# Patient Record
Sex: MALE | Race: WHITE | URBAN - METROPOLITAN AREA
[De-identification: names, ages, dates, MRNs, and addresses within clinical notes are randomized per-mention and may not be internally consistent; named-entity substitution may affect disease eponyms.]

---

## 2019-02-11 ENCOUNTER — HOSPITAL ENCOUNTER (OUTPATIENT)
Age: 24
Setting detail: SPECIMEN
Discharge: HOME | End: 2019-02-11
Payer: COMMERCIAL

## 2019-02-12 LAB — KEPPRA: 48 UG/ML

## 2021-08-08 ENCOUNTER — HOSPITAL ENCOUNTER (INPATIENT)
Age: 26
LOS: 1 days | Discharge: HOME | DRG: 101 | End: 2021-08-09
Attending: EMERGENCY MEDICINE | Admitting: INTERNAL MEDICINE

## 2021-08-08 ENCOUNTER — APPOINTMENT (OUTPATIENT)
Dept: GENERAL RADIOLOGY | Age: 26
DRG: 101 | End: 2021-08-08

## 2021-08-08 ENCOUNTER — APPOINTMENT (OUTPATIENT)
Dept: CT IMAGING | Age: 26
DRG: 101 | End: 2021-08-08

## 2021-08-08 DIAGNOSIS — R11.2 NAUSEA AND VOMITING, INTRACTABILITY OF VOMITING NOT SPECIFIED, UNSPECIFIED VOMITING TYPE: ICD-10-CM

## 2021-08-08 DIAGNOSIS — G40.919 BREAKTHROUGH SEIZURE (HCC): Primary | ICD-10-CM

## 2021-08-08 PROBLEM — R56.9 SEIZURE (HCC): Status: ACTIVE | Noted: 2021-08-08

## 2021-08-08 LAB
ALBUMIN SERPL-MCNC: 5.3 GM/DL (ref 3.4–5)
ALCOHOL SCREEN SERUM: 0.01 %WT/VOL
ALP BLD-CCNC: 146 IU/L (ref 40–129)
ALT SERPL-CCNC: 12 U/L (ref 10–40)
ANION GAP SERPL CALCULATED.3IONS-SCNC: 20 MMOL/L (ref 4–16)
AST SERPL-CCNC: 18 IU/L (ref 15–37)
BACTERIA: NEGATIVE /HPF
BASE EXCESS: 4 (ref 0–3.3)
BASOPHILS ABSOLUTE: 0.1 K/CU MM
BASOPHILS RELATIVE PERCENT: 0.4 % (ref 0–1)
BILIRUB SERPL-MCNC: 0.2 MG/DL (ref 0–1)
BILIRUBIN URINE: NEGATIVE MG/DL
BLOOD, URINE: ABNORMAL
BUN BLDV-MCNC: 9 MG/DL (ref 6–23)
CALCIUM SERPL-MCNC: 9.6 MG/DL (ref 8.3–10.6)
CHLORIDE BLD-SCNC: 104 MMOL/L (ref 99–110)
CLARITY: CLEAR
CO2: 14 MMOL/L (ref 21–32)
COLOR: YELLOW
COMMENT: ABNORMAL
CREAT SERPL-MCNC: 1 MG/DL (ref 0.9–1.3)
DIFFERENTIAL TYPE: ABNORMAL
EOSINOPHILS ABSOLUTE: 0.1 K/CU MM
EOSINOPHILS RELATIVE PERCENT: 0.2 % (ref 0–3)
GFR AFRICAN AMERICAN: >60 ML/MIN/1.73M2
GFR NON-AFRICAN AMERICAN: >60 ML/MIN/1.73M2
GLUCOSE BLD-MCNC: 109 MG/DL (ref 70–99)
GLUCOSE BLD-MCNC: 112 MG/DL (ref 70–99)
GLUCOSE, URINE: NEGATIVE MG/DL
HCO3 VENOUS: 23.8 MMOL/L (ref 19–25)
HCT VFR BLD CALC: 49.2 % (ref 42–52)
HEMOGLOBIN: 15.4 GM/DL (ref 13.5–18)
IMMATURE NEUTROPHIL %: 1.4 % (ref 0–0.43)
KETONES, URINE: ABNORMAL MG/DL
LACTATE: 1.6 MMOL/L (ref 0.4–2)
LACTATE: 3.4 MMOL/L (ref 0.4–2)
LEUKOCYTE ESTERASE, URINE: NEGATIVE
LIPASE: 26 IU/L (ref 13–60)
LYMPHOCYTES ABSOLUTE: 2.1 K/CU MM
LYMPHOCYTES RELATIVE PERCENT: 9 % (ref 24–44)
MAGNESIUM: 2.6 MG/DL (ref 1.8–2.4)
MCH RBC QN AUTO: 28.2 PG (ref 27–31)
MCHC RBC AUTO-ENTMCNC: 31.3 % (ref 32–36)
MCV RBC AUTO: 90.1 FL (ref 78–100)
MONOCYTES ABSOLUTE: 1.2 K/CU MM
MONOCYTES RELATIVE PERCENT: 5 % (ref 0–4)
NITRITE URINE, QUANTITATIVE: NEGATIVE
NUCLEATED RBC %: 0 %
O2 SAT, VEN: 74.7 % (ref 50–70)
PCO2, VEN: 53 MMHG (ref 38–52)
PDW BLD-RTO: 13 % (ref 11.7–14.9)
PH VENOUS: 7.26 (ref 7.32–7.42)
PH, URINE: 5 (ref 5–8)
PLATELET # BLD: 422 K/CU MM (ref 140–440)
PMV BLD AUTO: 9 FL (ref 7.5–11.1)
PO2, VEN: 46 MMHG (ref 28–48)
POTASSIUM SERPL-SCNC: 4.6 MMOL/L (ref 3.5–5.1)
PROTEIN UA: 30 MG/DL
RBC # BLD: 5.46 M/CU MM (ref 4.6–6.2)
RBC URINE: <1 /HPF (ref 0–3)
SEGMENTED NEUTROPHILS ABSOLUTE COUNT: 19.2 K/CU MM
SEGMENTED NEUTROPHILS RELATIVE PERCENT: 84 % (ref 36–66)
SODIUM BLD-SCNC: 138 MMOL/L (ref 135–145)
SPECIFIC GRAVITY UA: 1.02 (ref 1–1.03)
TOTAL IMMATURE NEUTOROPHIL: 0.33 K/CU MM
TOTAL NUCLEATED RBC: 0 K/CU MM
TOTAL PROTEIN: 8.8 GM/DL (ref 6.4–8.2)
TRICHOMONAS: ABNORMAL /HPF
UNCLASSIFIED CAST: 3 /LPF
UROBILINOGEN, URINE: NEGATIVE MG/DL (ref 0.2–1)
WBC # BLD: 22.9 K/CU MM (ref 4–10.5)
WBC UA: 1 /HPF (ref 0–2)

## 2021-08-08 PROCEDURE — 96374 THER/PROPH/DIAG INJ IV PUSH: CPT

## 2021-08-08 PROCEDURE — 83690 ASSAY OF LIPASE: CPT

## 2021-08-08 PROCEDURE — 82805 BLOOD GASES W/O2 SATURATION: CPT

## 2021-08-08 PROCEDURE — G0480 DRUG TEST DEF 1-7 CLASSES: HCPCS

## 2021-08-08 PROCEDURE — 80177 DRUG SCRN QUAN LEVETIRACETAM: CPT

## 2021-08-08 PROCEDURE — 36415 COLL VENOUS BLD VENIPUNCTURE: CPT

## 2021-08-08 PROCEDURE — 2580000003 HC RX 258: Performed by: NURSE PRACTITIONER

## 2021-08-08 PROCEDURE — 82962 GLUCOSE BLOOD TEST: CPT

## 2021-08-08 PROCEDURE — 6360000002 HC RX W HCPCS: Performed by: NURSE PRACTITIONER

## 2021-08-08 PROCEDURE — 2140000000 HC CCU INTERMEDIATE R&B

## 2021-08-08 PROCEDURE — 87150 DNA/RNA AMPLIFIED PROBE: CPT

## 2021-08-08 PROCEDURE — 2580000003 HC RX 258: Performed by: PHYSICIAN ASSISTANT

## 2021-08-08 PROCEDURE — 87040 BLOOD CULTURE FOR BACTERIA: CPT

## 2021-08-08 PROCEDURE — 6360000002 HC RX W HCPCS: Performed by: EMERGENCY MEDICINE

## 2021-08-08 PROCEDURE — 83605 ASSAY OF LACTIC ACID: CPT

## 2021-08-08 PROCEDURE — 85025 COMPLETE CBC W/AUTO DIFF WBC: CPT

## 2021-08-08 PROCEDURE — 80307 DRUG TEST PRSMV CHEM ANLYZR: CPT

## 2021-08-08 PROCEDURE — 70450 CT HEAD/BRAIN W/O DYE: CPT

## 2021-08-08 PROCEDURE — 83735 ASSAY OF MAGNESIUM: CPT

## 2021-08-08 PROCEDURE — 99284 EMERGENCY DEPT VISIT MOD MDM: CPT

## 2021-08-08 PROCEDURE — 6360000002 HC RX W HCPCS

## 2021-08-08 PROCEDURE — 6370000000 HC RX 637 (ALT 250 FOR IP): Performed by: EMERGENCY MEDICINE

## 2021-08-08 PROCEDURE — 80053 COMPREHEN METABOLIC PANEL: CPT

## 2021-08-08 PROCEDURE — 6360000002 HC RX W HCPCS: Performed by: PHYSICIAN ASSISTANT

## 2021-08-08 PROCEDURE — 2580000003 HC RX 258: Performed by: EMERGENCY MEDICINE

## 2021-08-08 PROCEDURE — 81001 URINALYSIS AUTO W/SCOPE: CPT

## 2021-08-08 PROCEDURE — 96361 HYDRATE IV INFUSION ADD-ON: CPT

## 2021-08-08 RX ORDER — SODIUM CHLORIDE 0.9 % (FLUSH) 0.9 %
5-40 SYRINGE (ML) INJECTION EVERY 12 HOURS SCHEDULED
Status: DISCONTINUED | OUTPATIENT
Start: 2021-08-08 | End: 2021-08-08 | Stop reason: SDUPTHER

## 2021-08-08 RX ORDER — ONDANSETRON 4 MG/1
4 TABLET, ORALLY DISINTEGRATING ORAL EVERY 8 HOURS PRN
Status: DISCONTINUED | OUTPATIENT
Start: 2021-08-08 | End: 2021-08-09 | Stop reason: HOSPADM

## 2021-08-08 RX ORDER — SODIUM CHLORIDE 9 MG/ML
25 INJECTION, SOLUTION INTRAVENOUS PRN
Status: DISCONTINUED | OUTPATIENT
Start: 2021-08-08 | End: 2021-08-09 | Stop reason: HOSPADM

## 2021-08-08 RX ORDER — LORAZEPAM 2 MG/ML
INJECTION INTRAMUSCULAR
Status: COMPLETED
Start: 2021-08-08 | End: 2021-08-08

## 2021-08-08 RX ORDER — SODIUM CHLORIDE 0.9 % (FLUSH) 0.9 %
5-40 SYRINGE (ML) INJECTION PRN
Status: DISCONTINUED | OUTPATIENT
Start: 2021-08-08 | End: 2021-08-09 | Stop reason: HOSPADM

## 2021-08-08 RX ORDER — PROMETHAZINE HYDROCHLORIDE 25 MG/1
25 TABLET ORAL ONCE
Status: COMPLETED | OUTPATIENT
Start: 2021-08-08 | End: 2021-08-08

## 2021-08-08 RX ORDER — ACETAMINOPHEN 650 MG/1
650 SUPPOSITORY RECTAL EVERY 6 HOURS PRN
Status: DISCONTINUED | OUTPATIENT
Start: 2021-08-08 | End: 2021-08-09 | Stop reason: HOSPADM

## 2021-08-08 RX ORDER — LORAZEPAM 1 MG/1
4 TABLET ORAL
Status: DISCONTINUED | OUTPATIENT
Start: 2021-08-08 | End: 2021-08-09 | Stop reason: HOSPADM

## 2021-08-08 RX ORDER — LORAZEPAM 2 MG/ML
2 INJECTION INTRAMUSCULAR
Status: DISCONTINUED | OUTPATIENT
Start: 2021-08-08 | End: 2021-08-09 | Stop reason: HOSPADM

## 2021-08-08 RX ORDER — LORAZEPAM 1 MG/1
3 TABLET ORAL
Status: DISCONTINUED | OUTPATIENT
Start: 2021-08-08 | End: 2021-08-09 | Stop reason: HOSPADM

## 2021-08-08 RX ORDER — LANOLIN ALCOHOL/MO/W.PET/CERES
100 CREAM (GRAM) TOPICAL DAILY
Status: DISCONTINUED | OUTPATIENT
Start: 2021-08-08 | End: 2021-08-09 | Stop reason: HOSPADM

## 2021-08-08 RX ORDER — SODIUM CHLORIDE 0.9 % (FLUSH) 0.9 %
5-40 SYRINGE (ML) INJECTION PRN
Status: DISCONTINUED | OUTPATIENT
Start: 2021-08-08 | End: 2021-08-08 | Stop reason: SDUPTHER

## 2021-08-08 RX ORDER — LEVETIRACETAM 500 MG/1
1500 TABLET ORAL 2 TIMES DAILY
Status: DISCONTINUED | OUTPATIENT
Start: 2021-08-08 | End: 2021-08-08

## 2021-08-08 RX ORDER — LORAZEPAM 1 MG/1
1 TABLET ORAL
Status: DISCONTINUED | OUTPATIENT
Start: 2021-08-08 | End: 2021-08-09 | Stop reason: HOSPADM

## 2021-08-08 RX ORDER — LORAZEPAM 1 MG/1
2 TABLET ORAL
Status: DISCONTINUED | OUTPATIENT
Start: 2021-08-08 | End: 2021-08-09 | Stop reason: HOSPADM

## 2021-08-08 RX ORDER — ONDANSETRON 2 MG/ML
4 INJECTION INTRAMUSCULAR; INTRAVENOUS EVERY 6 HOURS PRN
Status: DISCONTINUED | OUTPATIENT
Start: 2021-08-08 | End: 2021-08-09 | Stop reason: HOSPADM

## 2021-08-08 RX ORDER — ACETAMINOPHEN 325 MG/1
650 TABLET ORAL EVERY 6 HOURS PRN
Status: DISCONTINUED | OUTPATIENT
Start: 2021-08-08 | End: 2021-08-09 | Stop reason: HOSPADM

## 2021-08-08 RX ORDER — LORAZEPAM 2 MG/ML
2 INJECTION INTRAMUSCULAR EVERY 6 HOURS PRN
Status: DISCONTINUED | OUTPATIENT
Start: 2021-08-08 | End: 2021-08-09 | Stop reason: HOSPADM

## 2021-08-08 RX ORDER — 0.9 % SODIUM CHLORIDE 0.9 %
1000 INTRAVENOUS SOLUTION INTRAVENOUS ONCE
Status: COMPLETED | OUTPATIENT
Start: 2021-08-08 | End: 2021-08-08

## 2021-08-08 RX ORDER — SODIUM CHLORIDE 9 MG/ML
25 INJECTION, SOLUTION INTRAVENOUS PRN
Status: DISCONTINUED | OUTPATIENT
Start: 2021-08-08 | End: 2021-08-08 | Stop reason: SDUPTHER

## 2021-08-08 RX ORDER — LORAZEPAM 2 MG/ML
3 INJECTION INTRAMUSCULAR
Status: DISCONTINUED | OUTPATIENT
Start: 2021-08-08 | End: 2021-08-09 | Stop reason: HOSPADM

## 2021-08-08 RX ORDER — ONDANSETRON 4 MG/1
4 TABLET, ORALLY DISINTEGRATING ORAL 3 TIMES DAILY PRN
Qty: 21 TABLET | Refills: 0 | Status: SHIPPED | OUTPATIENT
Start: 2021-08-08

## 2021-08-08 RX ORDER — LORAZEPAM 2 MG/ML
2 INJECTION INTRAMUSCULAR ONCE
Status: COMPLETED | OUTPATIENT
Start: 2021-08-08 | End: 2021-08-08

## 2021-08-08 RX ORDER — LEVETIRACETAM 750 MG/1
1500 TABLET ORAL 2 TIMES DAILY
COMMUNITY

## 2021-08-08 RX ORDER — SODIUM CHLORIDE, SODIUM LACTATE, POTASSIUM CHLORIDE, CALCIUM CHLORIDE 600; 310; 30; 20 MG/100ML; MG/100ML; MG/100ML; MG/100ML
INJECTION, SOLUTION INTRAVENOUS CONTINUOUS
Status: DISPENSED | OUTPATIENT
Start: 2021-08-08 | End: 2021-08-09

## 2021-08-08 RX ORDER — M-VIT,TX,IRON,MINS/CALC/FOLIC 27MG-0.4MG
1 TABLET ORAL DAILY
Status: DISCONTINUED | OUTPATIENT
Start: 2021-08-08 | End: 2021-08-09 | Stop reason: HOSPADM

## 2021-08-08 RX ORDER — LEVETIRACETAM 500 MG/1
1500 TABLET ORAL 2 TIMES DAILY
Status: DISCONTINUED | OUTPATIENT
Start: 2021-08-09 | End: 2021-08-09 | Stop reason: HOSPADM

## 2021-08-08 RX ORDER — LORAZEPAM 2 MG/ML
4 INJECTION INTRAMUSCULAR
Status: DISCONTINUED | OUTPATIENT
Start: 2021-08-08 | End: 2021-08-09 | Stop reason: HOSPADM

## 2021-08-08 RX ORDER — LORAZEPAM 2 MG/ML
1 INJECTION INTRAMUSCULAR
Status: DISCONTINUED | OUTPATIENT
Start: 2021-08-08 | End: 2021-08-09 | Stop reason: HOSPADM

## 2021-08-08 RX ORDER — SODIUM CHLORIDE 0.9 % (FLUSH) 0.9 %
5-40 SYRINGE (ML) INJECTION EVERY 12 HOURS SCHEDULED
Status: DISCONTINUED | OUTPATIENT
Start: 2021-08-08 | End: 2021-08-09 | Stop reason: HOSPADM

## 2021-08-08 RX ORDER — ONDANSETRON 4 MG/1
4 TABLET, ORALLY DISINTEGRATING ORAL ONCE
Status: COMPLETED | OUTPATIENT
Start: 2021-08-08 | End: 2021-08-08

## 2021-08-08 RX ORDER — POLYETHYLENE GLYCOL 3350 17 G/17G
17 POWDER, FOR SOLUTION ORAL DAILY PRN
Status: DISCONTINUED | OUTPATIENT
Start: 2021-08-08 | End: 2021-08-09 | Stop reason: HOSPADM

## 2021-08-08 RX ADMIN — ONDANSETRON 4 MG: 4 TABLET, ORALLY DISINTEGRATING ORAL at 15:06

## 2021-08-08 RX ADMIN — PROMETHAZINE HYDROCHLORIDE 25 MG: 25 TABLET ORAL at 15:46

## 2021-08-08 RX ADMIN — LEVETIRACETAM 1500 MG: 100 INJECTION, SOLUTION INTRAVENOUS at 22:34

## 2021-08-08 RX ADMIN — ENOXAPARIN SODIUM 40 MG: 40 INJECTION SUBCUTANEOUS at 22:34

## 2021-08-08 RX ADMIN — SODIUM CHLORIDE 1000 ML: 9 INJECTION, SOLUTION INTRAVENOUS at 17:59

## 2021-08-08 RX ADMIN — LORAZEPAM 2 MG: 2 INJECTION INTRAMUSCULAR; INTRAVENOUS at 16:13

## 2021-08-08 RX ADMIN — SODIUM CHLORIDE, POTASSIUM CHLORIDE, SODIUM LACTATE AND CALCIUM CHLORIDE: 600; 310; 30; 20 INJECTION, SOLUTION INTRAVENOUS at 22:35

## 2021-08-08 RX ADMIN — SODIUM CHLORIDE, PRESERVATIVE FREE 10 ML: 5 INJECTION INTRAVENOUS at 22:36

## 2021-08-08 RX ADMIN — LEVETIRACETAM 1500 MG: 100 INJECTION, SOLUTION INTRAVENOUS at 17:06

## 2021-08-08 RX ADMIN — LORAZEPAM 2 MG: 2 INJECTION INTRAMUSCULAR at 16:13

## 2021-08-08 RX ADMIN — SODIUM CHLORIDE 1000 ML: 9 INJECTION, SOLUTION INTRAVENOUS at 15:45

## 2021-08-08 ASSESSMENT — ENCOUNTER SYMPTOMS
BACK PAIN: 0
SHORTNESS OF BREATH: 0
EYE DISCHARGE: 0
RHINORRHEA: 0
ABDOMINAL PAIN: 0
EYE PAIN: 0
NAUSEA: 1
COUGH: 0
SORE THROAT: 0
VOMITING: 1

## 2021-08-08 NOTE — ED PROVIDER NOTES
[address] ENCOUNTER      Pt Name: Stephanie Moore  MRN: [id number]  [place] 1995  Date of evaluation: 8/8/2021  Provider: Abner Maldonado MD    CHIEF COMPLAINT       Chief Complaint   Patient presents with    Seizures     Hx of seizures, per girlfriend he drank a 12 pack last night         [address] Mychal Childress is a 27 y.o. male who presents to the emergency department  for   Chief Complaint   Patient presents with    Seizures     Hx of seizures, per girlfriend he drank a 12 pack last night       53-year-old male presents with reported breakthrough seizure. He is also having nausea and vomiting. He reportedly drank a 12 pack of beer yesterday evening. He is on Keppra. He presents to the emergency department for evaluation. No report that he injured himself during the seizures. He is amnestic about the seizures in the emergency department. He does endorse significant alcohol use yesterday evening. Denies any marked abdominal pain. No vomiting was initially appreciated on her presentation is external signs of trauma. He is answering questions and moving extremity spontaneously. GCS of 15 in the emergency department. No convulsive activity noted. He denies any loss of bowel or urinary. No tongue biting. Nursing Notes, Triage Notes & Vital Signs were reviewed. REVIEW OF SYSTEMS    (2-9 systems for level 4, 10 or more for level 5)     Review of Systems   Constitutional: Negative for chills and fever. HENT: Negative for congestion, rhinorrhea and sore throat. Eyes: Negative for pain and discharge. Respiratory: Negative for cough and shortness of breath. Gastrointestinal: Positive for nausea and vomiting. Negative for abdominal pain. Endocrine: Negative for polydipsia and polyuria. Genitourinary: Negative for dysuria and flank pain.    Musculoskeletal: Negative for back pain and neck pain. Skin: Negative for pallor and wound. Neurological: Positive for seizures. Psychiatric/Behavioral: Negative for confusion. Except as noted above the remainder of the review of systems was reviewed and negative. PAST MEDICAL HISTORY   No past medical history on file. Prior to Admission medications    Medication Sig Start Date End Date Taking? Authorizing Provider   ondansetron (ZOFRAN-ODT) 4 MG disintegrating tablet Take 1 tablet by mouth 3 times daily as needed for Nausea or Vomiting 8/8/21  Yes Ron Renteria MD        There is no problem list on file for this patient. SURGICAL HISTORY     No past surgical history on file. CURRENT MEDICATIONS       Previous Medications    No medications on file       ALLERGIES     Antihistamines, diphenhydramine-type    FAMILY HISTORY     No family history on file. SOCIAL HISTORY       Social History     Socioeconomic History    Marital status: Single     Spouse name: Not on file    Number of children: Not on file    Years of education: Not on file    Highest education level: Not on file   Occupational History    Not on file   Tobacco Use    Smoking status: Not on file   Substance and Sexual Activity    Alcohol use: Not on file    Drug use: Not on file    Sexual activity: Not on file   Other Topics Concern    Not on file   Social History Narrative    Not on file     Social Determinants of Health     Financial Resource Strain:     Difficulty of Paying Living Expenses:    Food Insecurity:     Worried About Running Out of Food in the Last Year:     [address] in the Last Year:    Transportation Needs:     Lack of Transportation (Medical):      Lack of Transportation (Non-Medical):    Physical Activity:     Days of Exercise per Week:     Minutes of Exercise per Session:    Stress:     Feeling of Stress :    Social Connections:     Frequency of Communication with Friends and Family:     Frequency of Social Gatherings with Friends and Family:     Attends Sabianism Services:     Active Member of Clubs or Organizations:     Attends Club or Organization Meetings:     Marital Status:    Intimate Partner Violence:     Fear of Current or Ex-Partner:     Emotionally Abused:     Physically Abused:     Sexually Abused:        SCREENINGS               PHYSICAL EXAM    (up to 7 for level 4, 8 or more for level 5)     ED Triage Vitals   BP Temp Temp src Pulse Resp SpO2 Height Weight   -- -- -- -- -- -- -- --       Physical Exam  Vitals reviewed. Constitutional:       General: He is not in acute distress. Appearance: He is not diaphoretic. HENT:      Head: Normocephalic and atraumatic. Nose: No congestion or rhinorrhea. Mouth/Throat:      Mouth: Mucous membranes are moist.      Pharynx: No oropharyngeal exudate or posterior oropharyngeal erythema. Eyes:      General:         Right eye: No discharge. Left eye: No discharge. Pupils: Pupils are equal, round, and reactive to light. Cardiovascular:      Rate and Rhythm: Tachycardia present. Heart sounds: No friction rub. No gallop. Pulmonary:      Effort: Pulmonary effort is normal. No respiratory distress. Chest:      Chest wall: No tenderness. Abdominal:      Tenderness: There is no abdominal tenderness. There is no guarding. Musculoskeletal:         General: No tenderness or deformity. Normal range of motion. Cervical back: Normal range of motion and neck supple. Skin:     General: Skin is warm. Capillary Refill: Capillary refill takes less than 2 seconds. Findings: No erythema or lesion. Neurological:      General: No focal deficit present. Mental Status: He is alert and oriented to person, place, and time.          DIAGNOSTIC RESULTS     Labs Reviewed   COMPREHENSIVE METABOLIC PANEL W/ REFLEX TO MG FOR LOW K - Abnormal; Notable for the following components:       Result Value    CO2 14 (*)     Glucose 109 (*)     Albumin 5.3 (*)     Total Protein 8.8 (*)     Alkaline Phosphatase 146 (*)     Anion Gap 20 (*)     All other components within normal limits   CBC WITH AUTO DIFFERENTIAL - Abnormal; Notable for the following components:    WBC 22.9 (*)     MCHC 31.3 (*)     Segs Relative 84.0 (*)     Lymphocytes % 9.0 (*)     Monocytes % 5.0 (*)     Immature Neutrophil % 1.4 (*)     All other components within normal limits   ETHANOL - Abnormal; Notable for the following components:    Alcohol Scrn 0.01 (*)     All other components within normal limits   LIPASE        RADIOLOGY:     Non-plain film images such as CT, Ultrasound and MRI are read by the radiologist. Plain radiographic images are visualized and preliminarily interpreted by the emergency physician. Interpretation per the Radiologist below, if available at the time of this note:    CT HEAD WO CONTRAST   Final Result   No acute intracranial abnormality. ED BEDSIDE ULTRASOUND:   Performed by ED Physician Ron Renteria MD       LABS:  Labs Reviewed   COMPREHENSIVE METABOLIC PANEL W/ REFLEX TO MG FOR LOW K - Abnormal; Notable for the following components:       Result Value    CO2 14 (*)     Glucose 109 (*)     Albumin 5.3 (*)     Total Protein 8.8 (*)     Alkaline Phosphatase 146 (*)     Anion Gap 20 (*)     All other components within normal limits   CBC WITH AUTO DIFFERENTIAL - Abnormal; Notable for the following components:    WBC 22.9 (*)     MCHC 31.3 (*)     Segs Relative 84.0 (*)     Lymphocytes % 9.0 (*)     Monocytes % 5.0 (*)     Immature Neutrophil % 1.4 (*)     All other components within normal limits   ETHANOL - Abnormal; Notable for the following components:    Alcohol Scrn 0.01 (*)     All other components within normal limits   LIPASE       All other labs were within normal range or not returned as of this dictation.     EMERGENCY DEPARTMENT COURSE and DIFFERENTIAL DIAGNOSIS/MDM:   Vitals:    Vitals: 08/08/21 1207 08/08/21 1345   BP: 128/68    Pulse: 115 102   Resp: 17 19   Temp: 97.8 °F (36.6 °C)    TempSrc: Oral    SpO2: 100% 100%           MDM  Number of Diagnoses or Management Options  Breakthrough seizure (HCC)  Nausea and vomiting, intractability of vomiting not specified, unspecified vomiting type  Diagnosis management comments: 68-year-old male presents with breakthrough seizure. He had significant alcohol consumption yesterday evening. Reportedly had breakthrough seizure earlier today. He is amnestic about it. He presents for evaluation. Poorly his also had some nausea and vomiting. He is answering questions, moving all extremities and following commands and the emergency department. No active vomiting initially though he does endorse some nausea. Presents with tachycardia. Vitals otherwise unremarkable. Later sets of vitals do show improved tachycardia. Did obtain labs as well as CT head. Sodium is unremarkable. His white count is elevated 22.9. Lipase unremarkable. CT head is nonacute. His mental status has been stable in the emergency department. He iniitally did not have signs of convulsions or seizure-like activity. He is treated with antiemetics in the emergency department. He is also treated with fluids. While in the emergency department, he did have a convulsive episode. He was treated with 2 mg of Ativan. Given that he appears to be having cluster seizures, he will be loaded with Keppra and will be admitted. He is not currently linked with a neurologist.       Amount and/or Complexity of Data Reviewed  Decide to obtain previous medical records or to obtain history from someone other than the patient: yes        -  Patient seen and evaluated in the emergency department. -  Triage and nursing notes reviewed and incorporated. -  Old chart records reviewed and incorporated.   -  Work-up included:  See above  -  Results discussed with patient. CONSULTS:  None    PROCEDURES:  None performed unless otherwise noted below     Procedures        FINAL IMPRESSION      1. Breakthrough seizure ([address]    2. Nausea and vomiting, intractability of vomiting not specified, unspecified vomiting type          DISPOSITION/PLAN   DISPOSITION  08/08/2021 03:39:53 PM      PATIENT REFERRED TO:  [address] [place] [address] [address]  [address]  \[facility]\"" [id number]  Schedule an appointment as soon as possible for a visit in 2 days        DISCHARGE MEDICATIONS:  New Prescriptions    ONDANSETRON (ZOFRAN-ODT) 4 MG DISINTEGRATING TABLET    Take 1 tablet by mouth 3 times daily as needed for Nausea or Vomiting       ED Provider Disposition Time  DISPOSITION  08/08/2021 03:39:53 PM      Appropriate personal protective equipment was worn during the patient's evaluation. These included surgical, eye protection, surgical mask or in 95 respirator and gloves. The patient was also placed in a surgical mask for the prevention of possible spread of respiratory viral illnesses. The Patient was instructed to read the package inserts with any medication that was prescribed. Major potential reactions and medication interactions were discussed. The Patient understands that there are numerous possible adverse reactions not covered. The patient was also instructed to arrange follow-up with his or her primary care provider for review of any pending labwork or incidental findings on any radiology results that were obtained. All efforts were made to discuss any incidental findings that require further monitoring. Controlled Substances Monitoring:     No flowsheet data found.     (Please note that portions of this note were completed with a voice recognition program.  Efforts were made to edit the dictations but occasionally words are mis-transcribed.)    Marylene Schlatter, MD (electronically signed)  Attending Emergency Physician           Marylene Schlatter, MD  08/08/21 4336

## 2021-08-08 NOTE — ED NOTES
Pt sts he doesn't think he could get up and walk right now. Pt sts \"I don't feel well. \"     Ivonne Guzman, VIDAL  08/08/21 4738

## 2021-08-08 NOTE — ED NOTES
Bed: ED-25  Expected date:   Expected time:   Means of arrival:   Comments:  Blanca Murry [id number], RN  08/08/21 7595

## 2021-08-08 NOTE — ED NOTES
MD made RN aware pt is having seizure. Upon entering the room RN found pt seizing. Pt placed on monitor and suctioned secretions. Pt maintaining airway at this time. Pt seized for approx 1.5 mins.  Ativan given     Sofya Hutton RN  08/08/21 7521

## 2021-08-08 NOTE — H&P
History and Physical      Name:  Virgen Goodrich /Age/Sex: 1995  (27 y.o. male)   MRN & CSN:  [id number] & [id number] Admission Date/Time: 2021 11:56 AM   Location:  ED25/ED-25 PCP: No primary care provider on file. Hospital Day: 1    Assessment and Plan:   Virgen Goodrich is a 27 y.o.  male with history of epilepsy who presents with seizure.      Breakthrough seizures  -History of epilepsy, does not follow with neurology  - presented with two tonic-clonic seizures at home after a night of heavy alcohol use; one witnessed convulsive episode in the emergency department, treated with 2 mg of Ativan  -CT head with no acute process  -Postictal on admission, but no obvious focal neurologic signs, maintaining airway  -Given Keppra load, continued home Keppra dosing, unclear if compliant with home med  -Continue seizure precautions, Ativan as needed, Keppra level pending, infectious work-up pending  -Consult neurology    High anion gap metabolic acidosis  -Admit CO2 14, AG 20  - ED reported appropriate mentation prior to seizure   -Suspect secondary to above, vomiting  -ordered additional 1L IVF bolus and started mIVF x 12 hours; check VBG and lactic acid    Leukocytosis  - WBC 22  - afebrile without infectious symptoms   - check CXR, UA and BCx given above, however likely reactive     Alcohol use  -Fiancé at bedside reported occasional binge drinking, no known history of alcohol withdrawal  -Reported drinking a 12 pack of beer on night prior to admission  -ED BAL 0.01   -Start CIWA, however likely will be able to de-escalate    Diet No diet orders on file   DVT Prophylaxis [x] Lovenox, []  Heparin, [] SCDs, [] Ambulation   GI Prophylaxis [] PPI,  [] H2 Blocker,  [] Carafate,  [x] Diet/Tube Feeds   Code Status No Order   Disposition Patient requires continued admission due to seizures   MDM      History of Present Illness:     Chief Complaint: Virgen Goodrich is a 27 y.o.  male  who presents with seizures. History is obtained from edison at bedside as patient is postictal upon my evaluation. Edison states that last night he had about a 12 pack of beer. She states that he sometimes binge drinks about 1-2 times a month. She states that this morning he had a seizure lasting about a minute that terminated on its own. He subsequently had another seizure that lasted about 30 seconds. She states he does have a history of seizures and takes Keppra at home. She believes he has been compliant with his medications, but she has a hard percent sure. He has not complained of any infectious symptoms including fever, chills, headache, cough, sore throat, diarrhea. He did have some nausea and vomiting this morning however she feels this is probably due to his binge drinking last night. Upon my evaluation, the patient is unable to provide any history. He easily arousable, but falls back to sleep. I discussed this patient with the ED provider. I did a review of patient's medical records, lab results and imaging conducted today. I personally reviewed patient's vital signs including pulse ox. Ten point ROS reviewed negative, unless as noted above    Objective:   No intake or output data in the 24 hours ending 08/08/21 1748   Vitals:   Vitals:    08/08/21 1730   BP: 129/68   Pulse: 123   Resp: 25   Temp:    SpO2: 100%     Physical Exam:   GEN postictal Male laying in bed in no apparent distress. Appears given age. EYES Pupils are equally round. No scleral erythema, discharge, or conjunctivitis. HENT Mucous membranes are moist.  NECK Supple, no apparent thyromegaly or masses. RESP Clear to auscultation, no wheezes, rales or rhonchi. Respirations even and unlabored on NC.   CARDIO/VASC   S1/S2 auscultated. Mild tachycardia. Peripheral pulses equal bilaterally and palpable. No peripheral edema. GI Abdomen is soft without significant tenderness, masses, or guarding. Bowel sounds are normoactive.     No costovertebral angle tenderness. Jacinto catheter is not present. MSK No gross joint deformities. SKIN Normal coloration, warm, dry. NEURO Postictal, no obvious focal deficits, JARQUIN spontaneously, PEERL  PSYCH Postical, easily arousable, but falls back to sleep     Past Medical History:      Past Medical History:   Diagnosis Date    Seizures ([address]      PSHX:  has a past surgical history that includes Anterior cruciate ligament repair. Allergies: Allergies   Allergen Reactions    Antihistamines, Diphenhydramine-Type      Pt is unsure what happens       FAM HX: family history includes Seizures in his sister. Soc HX:   Social History     Socioeconomic History    Marital status: Single     Spouse name: None    Number of children: None    Years of education: None    Highest education level: None   Occupational History    None   Tobacco Use    Smoking status: Never Smoker    Smokeless tobacco: Current User    Tobacco comment: occasionally vapes   Substance and Sexual Activity    Alcohol use: Yes     Comment: girlfriend reported binge drink 1-2 times per month    Drug use: Not Currently    Sexual activity: None   Other Topics Concern    None   Social History Narrative    None     Social Determinants of Health     Financial Resource Strain:     Difficulty of Paying Living Expenses:    Food Insecurity:     Worried About Running Out of Food in the Last Year:     Ran Out of Food in the Last Year:    Transportation Needs:     Lack of Transportation (Medical):      Lack of Transportation (Non-Medical):    Physical Activity:     Days of Exercise per Week:     Minutes of Exercise per Session:    Stress:     Feeling of Stress :    Social Connections:     Frequency of Communication with Friends and Family:     Frequency of Social Gatherings with Friends and Family:     Attends Buddhist Services:     Active Member of Clubs or Organizations:     Attends Club or Organization Meetings:     Marital Status:    Intimate Partner Violence:     Fear of Current or Ex-Partner:     Emotionally Abused:     Physically Abused:     Sexually Abused:        Medications:   Medications:      Infusions:   PRN Meds:       Electronically signed by Raji Denis PA-C on 8/8/2021 at 5:48 PM

## 2021-08-08 NOTE — ED NOTES
MD made aware pts heart rate remains 130-150 and pt remains post ictal.      Sabi Mccrary, VIDAL  08/08/21 9216

## 2021-08-09 ENCOUNTER — APPOINTMENT (OUTPATIENT)
Dept: GENERAL RADIOLOGY | Age: 26
DRG: 101 | End: 2021-08-09

## 2021-08-09 VITALS
WEIGHT: 188.2 LBS | TEMPERATURE: 97.6 F | RESPIRATION RATE: 18 BRPM | OXYGEN SATURATION: 97 % | HEART RATE: 75 BPM | HEIGHT: 67 IN | SYSTOLIC BLOOD PRESSURE: 114 MMHG | DIASTOLIC BLOOD PRESSURE: 77 MMHG | BODY MASS INDEX: 29.54 KG/M2

## 2021-08-09 LAB
AMPHETAMINES: NEGATIVE
ANION GAP SERPL CALCULATED.3IONS-SCNC: 11 MMOL/L (ref 4–16)
BARBITURATE SCREEN URINE: NEGATIVE
BASOPHILS ABSOLUTE: 0 K/CU MM
BASOPHILS RELATIVE PERCENT: 0.2 % (ref 0–1)
BENZODIAZEPINE SCREEN, URINE: NEGATIVE
BUN BLDV-MCNC: 8 MG/DL (ref 6–23)
CALCIUM SERPL-MCNC: 9.3 MG/DL (ref 8.3–10.6)
CANNABINOID SCREEN URINE: NEGATIVE
CHLORIDE BLD-SCNC: 104 MMOL/L (ref 99–110)
CO2: 22 MMOL/L (ref 21–32)
COCAINE METABOLITE: NEGATIVE
CREAT SERPL-MCNC: 0.8 MG/DL (ref 0.9–1.3)
DIFFERENTIAL TYPE: ABNORMAL
EOSINOPHILS ABSOLUTE: 0 K/CU MM
EOSINOPHILS RELATIVE PERCENT: 0.1 % (ref 0–3)
GFR AFRICAN AMERICAN: >60 ML/MIN/1.73M2
GFR NON-AFRICAN AMERICAN: >60 ML/MIN/1.73M2
GLUCOSE BLD-MCNC: 97 MG/DL (ref 70–99)
HCT VFR BLD CALC: 41.6 % (ref 42–52)
HEMOGLOBIN: 13.4 GM/DL (ref 13.5–18)
IMMATURE NEUTROPHIL %: 0.5 % (ref 0–0.43)
LACTATE: 1.1 MMOL/L (ref 0.4–2)
LYMPHOCYTES ABSOLUTE: 1.7 K/CU MM
LYMPHOCYTES RELATIVE PERCENT: 9.8 % (ref 24–44)
MCH RBC QN AUTO: 27.6 PG (ref 27–31)
MCHC RBC AUTO-ENTMCNC: 32.2 % (ref 32–36)
MCV RBC AUTO: 85.8 FL (ref 78–100)
MONOCYTES ABSOLUTE: 1.7 K/CU MM
MONOCYTES RELATIVE PERCENT: 9.4 % (ref 0–4)
NUCLEATED RBC %: 0 %
OPIATES, URINE: NEGATIVE
OXYCODONE: NEGATIVE
PDW BLD-RTO: 13.4 % (ref 11.7–14.9)
PHENCYCLIDINE, URINE: NEGATIVE
PHOSPHORUS: 4.4 MG/DL (ref 2.5–4.9)
PLATELET # BLD: 381 K/CU MM (ref 140–440)
PMV BLD AUTO: 9.5 FL (ref 7.5–11.1)
POTASSIUM SERPL-SCNC: 4.4 MMOL/L (ref 3.5–5.1)
RBC # BLD: 4.85 M/CU MM (ref 4.6–6.2)
SEGMENTED NEUTROPHILS ABSOLUTE COUNT: 14.2 K/CU MM
SEGMENTED NEUTROPHILS RELATIVE PERCENT: 80 % (ref 36–66)
SODIUM BLD-SCNC: 137 MMOL/L (ref 135–145)
TOTAL IMMATURE NEUTOROPHIL: 0.09 K/CU MM
TOTAL NUCLEATED RBC: 0 K/CU MM
WBC # BLD: 17.7 K/CU MM (ref 4–10.5)

## 2021-08-09 PROCEDURE — 80048 BASIC METABOLIC PNL TOTAL CA: CPT

## 2021-08-09 PROCEDURE — 6370000000 HC RX 637 (ALT 250 FOR IP): Performed by: PHYSICIAN ASSISTANT

## 2021-08-09 PROCEDURE — 36415 COLL VENOUS BLD VENIPUNCTURE: CPT

## 2021-08-09 PROCEDURE — 85025 COMPLETE CBC W/AUTO DIFF WBC: CPT

## 2021-08-09 PROCEDURE — 6370000000 HC RX 637 (ALT 250 FOR IP): Performed by: NURSE PRACTITIONER

## 2021-08-09 PROCEDURE — 84100 ASSAY OF PHOSPHORUS: CPT

## 2021-08-09 PROCEDURE — 83605 ASSAY OF LACTIC ACID: CPT

## 2021-08-09 PROCEDURE — 71045 X-RAY EXAM CHEST 1 VIEW: CPT

## 2021-08-09 PROCEDURE — 99254 IP/OBS CNSLTJ NEW/EST MOD 60: CPT | Performed by: CLINICAL NURSE SPECIALIST

## 2021-08-09 PROCEDURE — 99223 1ST HOSP IP/OBS HIGH 75: CPT | Performed by: PSYCHIATRY & NEUROLOGY

## 2021-08-09 RX ORDER — LANOLIN ALCOHOL/MO/W.PET/CERES
100 CREAM (GRAM) TOPICAL DAILY
Qty: 30 TABLET | Refills: 0 | Status: SHIPPED | OUTPATIENT
Start: 2021-08-10

## 2021-08-09 RX ORDER — M-VIT,TX,IRON,MINS/CALC/FOLIC 27MG-0.4MG
1 TABLET ORAL DAILY
Qty: 30 TABLET | Refills: 0
Start: 2021-08-10

## 2021-08-09 RX ORDER — AMOXICILLIN AND CLAVULANATE POTASSIUM 875; 125 MG/1; MG/1
1 TABLET, FILM COATED ORAL 2 TIMES DAILY
Qty: 14 TABLET | Refills: 0 | Status: SHIPPED | OUTPATIENT
Start: 2021-08-09 | End: 2021-08-16

## 2021-08-09 RX ADMIN — LEVETIRACETAM 1500 MG: 500 TABLET, FILM COATED ORAL at 09:28

## 2021-08-09 RX ADMIN — Medication 100 MG: at 09:28

## 2021-08-09 RX ADMIN — Medication 1 TABLET: at 09:28

## 2021-08-09 NOTE — CONSULTS
Neurology Service Consult Note  [facility]    Patient Name: Tesfaye Hernandez  : 1995        Subjective:   Reason for consult: Seizure  Patient seen, interviewed, and examined. Chart reviewed in detail   Esha Grimes is a 27 y.o. male with PMH of Seizure disorder presents to [facility] with c/o witnessed seizure activity. Patient is unable to recall events of seizure, however significant other present at bedside during exam. Per significant other, patient had two seizures prior to arriving to the hospital and one in the ED. She described them as his \"usual jerking motions. \" Per the significant other, patient had his last seizure five months ago. There was no tongue biting or loss of bowel of bladder. He was given and bolus of Keppra and Ativan in the ED. Patients reports that he started having seizures as a child and is currently on Keppra 1500 BID. He denies missing any doses, as he reports having approximately two/three breakthrough seizures per year. He states he has been on this dose since high school and medications are being managed by PCP. Patient endorses lack of sleep because of young children and admits to cinthia drinking over the weekend. He states that he hasn't used alcohol in a long while. Patient educated on the importance of adequate sleep and the cessation of cinthia drinking. Seizure precautions explained in detail. Patient informed to not drive until cleared by your PCP. No tub baths or swimming. No operating heavy or dangerous equipment. Do not use ladders. Patient states he feels back to his baseline this morning.    Past Medical History:   Diagnosis Date    Seizures ([address]     :   Past Surgical History:   Procedure Laterality Date    ANTERIOR CRUCIATE LIGAMENT REPAIR       Medications:  Scheduled Meds:   enoxaparin  40 mg Subcutaneous Nightly    sodium chloride flush  5-40 mL Intravenous 2 times per day    thiamine  100 mg Oral Daily    multivitamin  1 tablet Oral Daily levETIRAcetam  1,500 mg Oral BID     Continuous Infusions:   sodium chloride       PRN Meds:.sodium chloride, ondansetron **OR** ondansetron, polyethylene glycol, acetaminophen **OR** acetaminophen, LORazepam, sodium chloride flush, LORazepam **OR** LORazepam **OR** LORazepam **OR** LORazepam **OR** LORazepam **OR** LORazepam **OR** LORazepam **OR** LORazepam    Allergies   Allergen Reactions    Antihistamines, Diphenhydramine-Type      Pt is unsure what happens     Social History     Socioeconomic History    Marital status: Single     Spouse name: Not on file    Number of children: Not on file    Years of education: Not on file    Highest education level: Not on file   Occupational History    Not on file   Tobacco Use    Smoking status: Never Smoker    Smokeless tobacco: Current User    Tobacco comment: occasionally vapes   Substance and Sexual Activity    Alcohol use: Yes     Comment: girlfriend reported binge drink 1-2 times per month    Drug use: Not Currently    Sexual activity: Not on file   Other Topics Concern    Not on file   Social History Narrative    Not on file     Social Determinants of Health     Financial Resource Strain:     Difficulty of Paying Living Expenses:    Food Insecurity:     Worried About Running Out of Food in the Last Year:     Ran Out of Food in the Last Year:    Transportation Needs:     Lack of Transportation (Medical):     Lack of Transportation (Non-Medical):    Physical Activity:     Days of Exercise per Week:     Minutes of Exercise per Session:    Stress:     Feeling of Stress :    Social Connections:     Frequency of Communication with Friends and Family:     Frequency of Social Gatherings with Friends and Family:     Attends Baptism Services:      Active Member of Clubs or Organizations:     Attends Club or Organization Meetings:     Marital Status:    Intimate Partner Violence:     Fear of Current or Ex-Partner:     Emotionally Abused:     Physically Abused:     Sexually Abused:       Family History   Problem Relation Age of Onset    Seizures Sister        Review of Symptoms:    10-point system review completed. All of which are negative except as mentioned above. Physical Exam:       [address] Name Min Max   Temp 97.8 °F (36.6 °C) 97.9 °F (36.6 °C)   Pulse 86 123   Resp 17 25   BP: Systolic 99 487   BP: Diastolic 62 77   SpO2 026 % 100 %         Gen: A&O x 4, NAD, cooperative  HEENT: NC/AT, EOMI, PERRL, mmm, no carotid bruits, neck supple, no meningeal signs; Heart: ST on monitor  Lungs:Even/unlabored  Ext: no edema, no calf tenderness b/l  Psych: normal mood and affect  Skin: no rashes or lesions    NEUROLOGIC EXAM:    Mental Status: A&O to self, location, month and year, NAD, speech clear, language fluent, , follows commands appropriately    Cranial Nerve Exam:   CN II-XII:  PERRL, VFF, no nystagmus, no gaze paresis, sensation V1-V3 intact b/l, muscles of facial expression symmetric; hearing intact to conversational tone, palate elevates symmetrically, shoulder elevation symmetric and tongue protrudes midline with movement side to side.     Motor Exam:       Strength 5/5 UE's/LE's b/l  Tone and bulk normal   No pronator drift    Deep Tendon Reflexes: 2/4 biceps, triceps, brachioradialis, patellar, and achilles b/l; flexor plantar responses b/l    Sensation: Intact light touch/vibration UE's/LE's b/l    Coordination/Cerebellum:       Tremors--none      Rapidly alternating movements: no dysdiadochokinesia b/l                Finger-to-Nose: no dysmetria b/l    Gait and stance:      Gait: deferred      LABS:     Recent Labs     08/08/21  1216 08/09/21  0201   WBC 22.9* 17.7*    137   K 4.6 4.4    104   CO2 14* 22   BUN 9 8   CREATININE 1.0 0.8*   GLUCOSE 109* 97   [email or web address]@  [email or web address] TSH Results,[unfilled],     Last Liver Function Results:  @LABRCNTIP(ALT:3,AST:3,BILITOTAL:3,BILIDIR:3,ALKPHOS:3)@          IMAGING:    CT of head w/o contrast    Impression   No acute intracranial abnormality. Above imaging personally reviewed. ASSESSMENT/PLAN:   Franko Trujillo is a 27 y.o. male with PMH of Seizure disorder admitted for witnessed seizure activity. He is back to baseline. No further seizures. Exam is non focal.   Breakthrough Seizure likely caused by increased alcohol consumption and sleep deprivation. CT as above  Continue on Keppra 1500 mg P.O BID  Do not drive until cleared by your PCP. No tub baths or swimming. No operating heavy or dangerous equipment. Do not use ladders. Discussed however will need to refrain from cinthia drinking. He will need to endorse healthy sleeping habits and collectively, this will likely allow the Keppra to maintain his seizure threshold to likely prevent breakthrough seizure activity. No further neurological testing is warranted at this time. We will sign off. Call us if needed. Thank you     Patient and plan of care discussed in detail with attending physician Dr. Isaiah King. Thank you for allowing us to participate in the care of your patient. If there are any questions regarding evaluation please feel free to contact us. July Abad, APRN - CNS, 8/9/2021      Attending Note:  I have rounded on this patient with Amy Andrade. I have reviewed the chart and we have discussed this case in detail. The patient was seen and examined by myself. Pertinent labs and imaging have been personally reviewed. Our findings and impressions were discussed with the patient. I concur with the Nurse Practioner's assessment and plan.     Geneva Naidu, DO

## 2021-08-09 NOTE — DISCHARGE SUMMARY
Discharge Summary    Name:  Emelina Woodard /Age/Sex: 1995  (27 y.o. male)   MRN & CSN:  [id number] & [id number] Admission Date/Time: 2021 11:56 AM   Attending:  No att. providers found Discharging Physician: Jennifer Jean MD     Hospital Course:   Emelina Woodard is a 27 y.o.  male  who presents with <principal problem not specified> seizure    Patient was admitted to floor and monitored. He was continued on his home medications, he did not have any recurrence of seizures. By the time of my visit to patient late this morning he was awake alert and oriented and feeling well with no constitutional symptoms, he was stable and afebrile. Neurology was consulted, evaluated him. Seizure attributed to alcohol and recommendation was continue seizure medication with no changes. His WBS was elevated with no shift and no findings and was attributed to seizure, it also trended downward from 23-17 so he was deemed appropriate for DC. He was to be placed on augmentin as precaution in case of aspiration. A few minutes after patient left I was contacted by lab that /4 cultures positive for strep species. I immediately attempted to contact patient on listed number no voicemail, tried many timed over a fee hours. No other contacts noted, staff was able to find number for his mother Fawad Roca [phone]. I was able to speak to her explained the situation and she will get in touch with patient to come back. I was notified by administration that because he was gone for a period of time he will need to be discharged from the system and re admittied. He will be admitted direct with Dx bacteremia and will be started on rocephin pending final culture results    1. BREAKTHROUGH SEIZURE  -due to alcohol. Continue same seizure medication follow up neurology as outpatient     2. BACTEREMIA  -/ cultures positive for strep species, may be a contaminant.  He was afebrile, no focus of infection and WBC trended down from 23-17 with no treatment, other labs normalized. Patient was discharged and within minutes of leaving his room I was contacted by lab to notify me of the result. I called his listed cell number [phone] no answer and mailbox not set up. I contacted his pharmacy [facility] [phone] spoke with tech to put Rx on hold, left message to return immediately and call me. Was able to contact mopther who will contact patient to come back for further treatment   I placed an order to start rocephin 1Gm daily once he returns     3. ALCOHOL ABUSE  Encouraged to stop drinking    The patient expressed appropriate understanding of and agreement with the discharge recommendations, medications, and plan. Consults this admission:  IP CONSULT TO HOSPITALIST  IP CONSULT TO NEUROLOGY    Discharge Instruction:   Follow up appointments:   Primary care physician:  within 2 weeks    Diet:  regular diet   Activity: activity as tolerated  Disposition: Discharged to: Home, [][facility], []SNF, []Acute Rehab, []Hospice   Condition on discharge: Stable    Discharge Medications:     He will be discharged on his previous levetiracetam 1500mg BID. Multivitamin and folic acid 1mg daily Augmentin was orginally sent to pharmacy but held due to above    Objective Findings at Discharge:   /77   Pulse 75   Temp 97.6 °F (36.4 °C) (Oral)   Resp 18   Ht 5' 7\" (1.702 m)   Wt 188 lb 3.2 oz (85.4 kg)   SpO2 97%   BMI 29.48 kg/m²            PHYSICAL EXAM   GEN Awake male, sitting upright in bed in no apparent distress. Appears given age. EYES Pupils are equally round. No scleral erythema, discharge, or conjunctivitis. HENT Mucous membranes are moist. Oral pharynx without exudates, no evidence of thrush. NECK Supple, no apparent thyromegaly or masses. RESP Clear to auscultation, no wheezes, rales or rhonchi. Symmetric chest movement while on room air. CARDIO/VASC S1/S2 auscultated.  Regular rate without appreciable murmurs, rubs, or gallops. No JVD or carotid bruits. Peripheral pulses equal bilaterally and palpable. No peripheral edema. GI Abdomen is soft without significant tenderness, masses, or guarding. Bowel sounds are normoactive. Rectal exam deferred. HEME/LYMPH No palpable cervical lymphadenopathy and no hepatosplenomegaly. No petechiae or ecchymoses. MSK No gross joint deformities. SKIN Normal coloration, warm, dry. NEURO Cranial nerves appear grossly intact, normal speech, no lateralizing weakness. PSYCH Awake, alert, oriented x 4. Affect appropriate.     BMP/CBC  Recent Labs     08/08/21  1216 08/09/21  0201    137   K 4.6 4.4    104   CO2 14* 22   BUN 9 8   CREATININE 1.0 0.8*   WBC 22.9* 17.7*   HCT 49.2 41.6*    381       IMAGING:      Discharge Time of 35 minutes    Electronically signed by Kiersten Yanez MD on 8/9/2021 at 5:42 PM

## 2021-08-09 NOTE — PLAN OF CARE
Problem: Skin Integrity:  Goal: Will show no infection signs and symptoms  Description: Will show no infection signs and symptoms  Outcome: Ongoing  Goal: Absence of new skin breakdown  Description: Absence of new skin breakdown  Outcome: Ongoing     Problem: Coping:  Goal: Ability to cope will improve  Description: Ability to cope will improve  Outcome: Ongoing  Goal: Ability to identify appropriate support needs will improve  Description: Ability to identify appropriate support needs will improve  Outcome: Ongoing     Problem: Health Behavior:  Goal: Ability to manage health-related needs will improve  Description: Ability to manage health-related needs will improve  Outcome: Ongoing     Problem: Physical Regulation:  Goal: Signs of adequate cerebral perfusion will increase  Description: Signs of adequate cerebral perfusion will increase  Outcome: Ongoing  Goal: Ability to maintain a stable neurologic state will improve  Description: Ability to maintain a stable neurologic state will improve  Outcome: Ongoing     Problem: Safety:  Goal: Ability to remain free from injury will improve  Description: Ability to remain free from injury will improve  Outcome: Ongoing     Problem: Self-Concept:  Goal: Level of anxiety will decrease  Description: Level of anxiety will decrease  Outcome: Ongoing  Goal: Ability to verbalize feelings about condition will improve  Description: Ability to verbalize feelings about condition will improve  Outcome: Ongoing

## 2021-08-09 NOTE — PLAN OF CARE
Problem: Skin Integrity:  Goal: Will show no infection signs and symptoms  Description: Will show no infection signs and symptoms  8/9/2021 1402 by Keiko Gonzáles RN  Outcome: Completed  8/9/2021 1023 by Keiko Gonzáles RN  Outcome: Ongoing  Goal: Absence of new skin breakdown  Description: Absence of new skin breakdown  8/9/2021 1402 by Keiko Gonzáles RN  Outcome: Completed  8/9/2021 1023 by Keiko Gonzáles RN  Outcome: Ongoing     Problem: Coping:  Goal: Ability to cope will improve  Description: Ability to cope will improve  8/9/2021 1402 by Keiko Gonzáles RN  Outcome: Completed  8/9/2021 1023 by Keiko Gonzáles RN  Outcome: Ongoing  Goal: Ability to identify appropriate support needs will improve  Description: Ability to identify appropriate support needs will improve  8/9/2021 1402 by Keiko Gonzáles RN  Outcome: Completed  8/9/2021 1023 by Keiko Gonzáles RN  Outcome: Ongoing     Problem: Health Behavior:  Goal: Ability to manage health-related needs will improve  Description: Ability to manage health-related needs will improve  8/9/2021 1402 by Keiko Gonzáles RN  Outcome: Completed  8/9/2021 1023 by Keiko Gonzáles RN  Outcome: Ongoing     Problem: Safety:  Goal: Ability to remain free from injury will improve  Description: Ability to remain free from injury will improve  8/9/2021 1402 by Keiko Gonzáles RN  Outcome: Completed  8/9/2021 1023 by Keiko Gonzáles RN  Outcome: Ongoing     Problem: Self-Concept:  Goal: Level of anxiety will decrease  Description: Level of anxiety will decrease  8/9/2021 1402 by Keiko Gonzáles RN  Outcome: Completed  8/9/2021 1023 by Keiko Gonzáles RN  Outcome: Ongoing  Goal: Ability to verbalize feelings about condition will improve  Description: Ability to verbalize feelings about condition will improve  8/9/2021 1402 by Keiko Gonzáles RN  Outcome: Completed  8/9/2021 1023 by Keiko Gonzáles RN  Outcome: Ongoing

## 2021-08-09 NOTE — PROGRESS NOTES
Hospitalist Progress Note      Name:  Roman Gaming. /Age/Sex: 1995  (27 y.o. male)   MRN & CSN:  [id number] & [id number] Admission Date/Time: 2021 11:56 AM   [facility]  [facility] PCP: No primary care provider on file. Hospital Day: 2    Assessment and Plan:   Roman Nunez is a 27 y.o.  male  who presents with <principal problem not specified>seizure    1. BREAKTHROUGH SEIZURE  -in 26 YO male with H/O alcohol use and previous seizure who consumed a quantity of beer and had a seizure. He was seen by neurology, he was continued on his medication with no change and deemed appropriate for DC    2. BACTEREMIA  - cultures positive for strep species, may be a contaminant. He was afebrile, no focus of infection and WBC trended down from 23-17 with no treatment, other labs normalized. Patient was discharged and within minutes of leaving his room I was contacted by lab to notify me of the result. I called his listed cell number [phone] no answer and mailbox not set up. I contacted his pharmacy [address] [phone] spoke with tech to put Rx on hold, left message to return immediately and call me. I have been trying to call him no answer and there are no other listed contacts and he does not have a PCP listed. I will continue to try to call him. His discharge has been cancelled and awaiting his return    I placed an order to start rocephin 1Gm daily once he returns    3. ALCOHOL ABUSE  I had encouraged cessation of use altogether    Diet ADULT DIET; Regular   DVT Prophylaxis [] Lovenox, []  Heparin, [] SCDs, [] Ambulation   GI Prophylaxis [] PPI,  [] H2 Blocker,  [] Carafate,  [] Diet/Tube Feeds   Code Status Full Code   Disposition Patient requires continued admission due to seizure   MDM [] Low, [] Moderate,[]  High  Patient's risk as above due to seizure     History of Present Illness:     Chief Complaint: <principal problem not specified>  Roman Nunez is a 27 y. o. male  who presents with seizure. He is currently awake, feels better. His fiancee is present at bedside. No specific complaints no cough or SOB no chills or sweats       Ten point ROS reviewed negative, unless as noted above    Objective: Intake/Output Summary (Last 24 hours) at 8/9/2021 1607  Last data filed at 8/9/2021 0930  Gross per 24 hour   Intake --   Output 700 ml   Net -700 ml      Vitals:   Vitals:    08/09/21 1201   BP: 114/77   Pulse: 75   Resp: 18   Temp: 97.6 °F (36.4 °C)   SpO2: 97%     Physical Exam:   GEN Awake male, sitting upright in bed in no apparent distress. Appears given age. EYES Pupils are equally round. No scleral erythema, discharge, or conjunctivitis. HENT Mucous membranes are moist. Oral pharynx without exudates, no evidence of thrush. NECK Supple, no apparent thyromegaly or masses. RESP Clear to auscultation, no wheezes, rales or rhonchi. Symmetric chest movement while on room air. CARDIO/VASC S1/S2 auscultated. Regular rate without appreciable murmurs, rubs, or gallops. No JVD or carotid bruits. Peripheral pulses equal bilaterally and palpable. No peripheral edema. GI Abdomen is soft without significant tenderness, masses, or guarding. Bowel sounds are normoactive. Rectal exam deferred. HEME/LYMPH No palpable cervical lymphadenopathy and no hepatosplenomegaly. No petechiae or ecchymoses. MSK No gross joint deformities. SKIN Normal coloration, warm, dry. NEURO Cranial nerves appear grossly intact, normal speech, no lateralizing weakness. PSYCH Awake, alert, oriented x 4. Affect appropriate.     Medications:   Medications:    cefTRIAXone (ROCEPHIN) IV  1,000 mg Intravenous Q24H    enoxaparin  40 mg Subcutaneous Nightly    sodium chloride flush  5-40 mL Intravenous 2 times per day    thiamine  100 mg Oral Daily    multivitamin  1 tablet Oral Daily    levETIRAcetam  1,500 mg Oral BID      Infusions:    sodium chloride       PRN Meds: sodium chloride, 25 mL, PRN  ondansetron, 4 mg, Q8H PRN   Or  ondansetron, 4 mg, Q6H PRN  polyethylene glycol, 17 g, Daily PRN  acetaminophen, 650 mg, Q6H PRN   Or  acetaminophen, 650 mg, Q6H PRN  LORazepam, 2 mg, Q6H PRN  sodium chloride flush, 5-40 mL, PRN  LORazepam, 1 mg, Q1H PRN   Or  LORazepam, 1 mg, Q1H PRN   Or  LORazepam, 2 mg, Q1H PRN   Or  LORazepam, 2 mg, Q1H PRN   Or  LORazepam, 3 mg, Q1H PRN   Or  LORazepam, 3 mg, Q1H PRN   Or  LORazepam, 4 mg, Q1H PRN   Or  LORazepam, 4 mg, Q1H PRN

## 2021-08-09 NOTE — DISCHARGE INSTR - DIET
Good nutrition is important when healing from an illness, injury, or surgery. Follow any nutrition recommendations given to you during your hospital stay. If you were given an oral nutrition supplement while in the hospital, continue to take this supplement at home. You can take it with meals, in-between meals, and/or before bedtime. These supplements can be purchased at most local grocery stores, pharmacies, and chain [employer]-stores. If you have any questions about your diet or nutrition, call the hospital and ask for the dietitian.       Resume previous home diet

## 2021-08-10 LAB — KEPPRA: 29 UG/ML (ref 12–46)

## 2021-08-10 NOTE — PROGRESS NOTES
HOSPITALIST ADDENDUM    See previous notes. Patient was discharged and afterwards I was notified of a positive blood culture. The patient had only 1 contact number listed as his. I tried multiple times to contact him over hours, no answer and voicemail not set up. Nursing was able to obtain his mothers phone number. I called her and told her I was unable to get in touch with patient, she said she would try to reach him, I give my personal cell number to call me back    The patient called me last night at 8:40 PM on my personal cell number. I explained the reason for calling about blood culture, he needed to come back right away to the hospital to begin IV antibiotics pending the final result of blood cultures. He said he felt fine and did not want to come back. I explained that there may be an infection in his blood, if he does not get treated he could have ill effects such as serious illness, organ damage including to his heart and possibly death. He understood and acknowledged the possible consequences as described above and said he would think about it. I then asked to speak to his Cam Righter, I explained the same to her, she was also listening as I was speaking to patient. She said she understood and would try to encourage him to come back to the hospital for treatment. ADDENDUM FOR 8/10/21    I have reviewed chart and note now 2 positive blood cultures growing strep species. I called back patient at the phone number he called me from last night  was able to leave a message encouraging him to return to hospital.. also called previous number [phone], no answer and voicemail not set up.

## 2021-08-11 LAB
CULTURE: ABNORMAL
CULTURE: ABNORMAL
Lab: ABNORMAL
SPECIMEN: ABNORMAL

## 2021-08-14 LAB
CULTURE: NORMAL
Lab: NORMAL
SPECIMEN: NORMAL